# Patient Record
Sex: FEMALE | Race: BLACK OR AFRICAN AMERICAN | NOT HISPANIC OR LATINO | URBAN - METROPOLITAN AREA
[De-identification: names, ages, dates, MRNs, and addresses within clinical notes are randomized per-mention and may not be internally consistent; named-entity substitution may affect disease eponyms.]

---

## 2018-04-13 ENCOUNTER — APPOINTMENT (OUTPATIENT)
Dept: LAB | Facility: HOSPITAL | Age: 73
End: 2018-04-13

## 2018-04-13 ENCOUNTER — TELEPHONE (OUTPATIENT)
Dept: NEUROLOGY | Facility: CLINIC | Age: 73
End: 2018-04-13

## 2018-04-13 ENCOUNTER — OFFICE VISIT (OUTPATIENT)
Dept: NEUROLOGY | Facility: CLINIC | Age: 73
End: 2018-04-13

## 2018-04-13 VITALS
DIASTOLIC BLOOD PRESSURE: 75 MMHG | SYSTOLIC BLOOD PRESSURE: 154 MMHG | WEIGHT: 191 LBS | OXYGEN SATURATION: 97 % | HEART RATE: 92 BPM

## 2018-04-13 DIAGNOSIS — G56.03 BILATERAL CARPAL TUNNEL SYNDROME: Primary | ICD-10-CM

## 2018-04-13 DIAGNOSIS — G62.9 NEUROPATHY: ICD-10-CM

## 2018-04-13 DIAGNOSIS — R79.89 OTHER SPECIFIED ABNORMAL FINDINGS OF BLOOD CHEMISTRY: ICD-10-CM

## 2018-04-13 LAB — HBA1C MFR BLD: 6.2 % (ref 4.8–5.6)

## 2018-04-13 PROCEDURE — 83921 ORGANIC ACID SINGLE QUANT: CPT | Performed by: NURSE PRACTITIONER

## 2018-04-13 PROCEDURE — 83036 HEMOGLOBIN GLYCOSYLATED A1C: CPT | Performed by: NURSE PRACTITIONER

## 2018-04-13 PROCEDURE — 99204 OFFICE O/P NEW MOD 45 MIN: CPT | Performed by: NURSE PRACTITIONER

## 2018-04-13 PROCEDURE — 36415 COLL VENOUS BLD VENIPUNCTURE: CPT | Performed by: NURSE PRACTITIONER

## 2018-04-13 RX ORDER — HYDROCHLOROTHIAZIDE 25 MG/1
25 TABLET ORAL DAILY
COMMUNITY

## 2018-04-13 RX ORDER — NAPROXEN 500 MG/1
500 TABLET ORAL 2 TIMES DAILY WITH MEALS
COMMUNITY

## 2018-04-13 RX ORDER — ATORVASTATIN CALCIUM 20 MG/1
20 TABLET, FILM COATED ORAL DAILY
COMMUNITY

## 2018-04-13 RX ORDER — AMLODIPINE BESYLATE AND ATORVASTATIN CALCIUM 10; 10 MG/1; MG/1
1 TABLET, FILM COATED ORAL DAILY
COMMUNITY

## 2018-04-13 RX ORDER — PREGABALIN 50 MG/1
50 CAPSULE ORAL 2 TIMES DAILY
Qty: 42 CAPSULE | Refills: 0 | Status: SHIPPED | OUTPATIENT
Start: 2018-04-13 | End: 2018-08-28

## 2018-04-13 RX ORDER — GABAPENTIN 300 MG/1
300 CAPSULE ORAL 3 TIMES DAILY
COMMUNITY
End: 2018-04-13

## 2018-04-13 RX ORDER — ASPIRIN 81 MG/1
81 TABLET, CHEWABLE ORAL DAILY
COMMUNITY

## 2018-04-13 RX ORDER — PREGABALIN 50 MG/1
50 CAPSULE ORAL 2 TIMES DAILY
Qty: 60 CAPSULE | Refills: 5 | Status: SHIPPED | OUTPATIENT
Start: 2018-04-13 | End: 2018-08-28

## 2018-04-13 RX ORDER — LEVOTHYROXINE SODIUM 112 UG/1
112 TABLET ORAL DAILY
COMMUNITY

## 2018-04-13 NOTE — PROGRESS NOTES
Subjective:     Patient ID: Wendy Griffiths is a 72 y.o. female.    CC:   Chief Complaint   Patient presents with   • Numbness   • Pain   • Gait Problem       HPI:   History of Present Illness   This is a very pleasant 72-year-old female who presents for initial neurological evaluation of numbness, burning and tingling sensations in bilateral hands and bilateral knees extending into bilateral feet.  She tells me that her symptoms have been present over the past 4 years after she had a total knee replacement.  She tells me that the numbness, tingling and burning sensation in her bilateral lower extremities they told her was secondary to some nerve damage following her surgery.  She tells me that this seems to have worsened over time.  She tells me that her symptoms are worse in the evening.  She's been taking gabapentin 300 mg a maximum of 2 pills at night.  She tells me that she is not able to tolerate 3 pills at night as this causes dizziness and lethargy.  She tells me she did try to take the gabapentin throughout the day but also felt too drowsy and falls asleep.  She tells me that the gabapentin has not really changed her symptoms significantly.  She tells me that she also gets numbness and tingling in her bilateral hands/wrists and has been a  for several years.  She tells me previously she was told she did not have carpal tunnel syndrome but she tells me that her symptoms are worse when she wakes up in the morning as well as when she does use her hands with repetitive movements.  She also tells me that in the remote past she wore some wrist splints but these have not been used in several years.  She tells me that she also has a history of lower back surgery ×2 and from that surgery her great toe on her left side had numbness and tingling.  She tells me she has never had a nerve or muscle study for further evaluation.  She tells me she's never used any creams for the neuropathic type pains.  She tells  me she has significant decrease in mobility in bilateral knees.  She tells me that she also had a stroke in 2008 and she developed numbness in the left side of her face and lips as well as left thumb numbness and she tells me the left face and lip numbness has resolved but the left thumb numbness continues-she is on aspirin and statin therapy for prevention with no recurrent symptoms.  She did recently have labs with her primary care provider 3/9/18 with results reviewed in office including a CBC with differential which showed hemoglobin 11.5, hematocrit 35.8 and otherwise within normal limits.  BMP was within normal limits except glucose was 102.  TSH was 8.22 and her thyroid medication was adjusted by PCP.  Vitamin B12 was 647.  Vitamin D was 34.2.  Her total cholesterol is 188, triglycerides 137, HDL 79.4 and LDL 81.She is not aware of being testing for DM2. She previously lived in [place], [place] with her  of 50 years who unfortunately passed away very unexpectedly on 11/15/2017 and she has moved back to [place], [place] to be close to her family.  She does use a cane for ambulation and denies any recent falls.    The following portions of the patient's history were reviewed and updated as appropriate: allergies, current medications, past family history, past medical history, past social history, past surgical history and problem list.    Past Medical History:   Diagnosis Date   • Arthritis    • Hyperlipidemia    • Hypertension    • Stroke        History reviewed. No pertinent surgical history.    Social History     Social History   • Marital status:      Spouse name: N/A   • Number of children: N/A   • Years of education: N/A     Occupational History   • Not on file.     Social History Main Topics   • Smoking status: Never Smoker   • Smokeless tobacco: Not on file   • Alcohol use No   • Drug use: Unknown   • Sexual activity: Defer     Other Topics Concern   • Not on file     Social History  Narrative   • No narrative on file       Family History   Problem Relation Age of Onset   • Hypertension Maternal Grandmother         Review of Systems   Constitutional: Negative.  Negative for chills, fatigue, fever and unexpected weight change.   HENT: Negative.  Negative for ear pain, hearing loss, nosebleeds, rhinorrhea and sore throat.    Eyes: Negative.  Negative for photophobia, pain, discharge, itching and visual disturbance.   Respiratory: Negative.  Negative for cough, chest tightness, shortness of breath and wheezing.    Cardiovascular: Negative.  Negative for chest pain, palpitations and leg swelling.   Gastrointestinal: Negative.  Negative for abdominal pain, blood in stool, constipation, diarrhea, nausea and vomiting.   Endocrine: Positive for heat intolerance.   Genitourinary: Negative.  Negative for dysuria, frequency, hematuria and urgency.   Musculoskeletal: Positive for arthralgias, back pain, gait problem, joint swelling, myalgias, neck pain and neck stiffness.   Skin: Negative.  Negative for rash and wound.   Allergic/Immunologic: Negative.  Negative for environmental allergies and food allergies.   Neurological: Positive for numbness. Negative for dizziness, tremors, seizures, syncope, speech difficulty, weakness, light-headedness and headaches.   Hematological: Negative for adenopathy. Does not bruise/bleed easily.   Psychiatric/Behavioral: Negative.  Negative for agitation, confusion, decreased concentration, hallucinations, sleep disturbance and suicidal ideas. The patient is not nervous/anxious.         Objective:    Neurologic Exam     Mental Status   Oriented to person, place, and time.   Registration: recalls 3 of 3 objects. Recall at 5 minutes: recalls 3 of 3 objects. Follows 3 step commands.   Attention: normal. Concentration: normal.   Speech: speech is normal   Level of consciousness: alert  Knowledge: good and consistent with education. Able to perform simple calculations.   Able to  "name object. Able to read. Able to repeat. Able to write. Normal comprehension.     Cranial Nerves   Cranial nerves II through XII intact.     Motor Exam   Muscle bulk: normal  Overall muscle tone: normal    Strength   Strength 5/5 except as noted. Bilateral Knees with Limited ROM     Sensory Exam   Right arm light touch: normal  Left arm light touch: normal  Right leg light touch: decreased from knee  Left leg light touch: decreased from knee  Right arm vibration: normal  Left arm vibration: normal  Right leg vibration: decreased from knee  Left leg vibration: decreased from knee (hyperasthesias and \"shock like\" sensations radiating to left knee)  Proprioception normal.   Right leg pinprick: decreased from toes  Left leg pinprick: decreased from toes (with hyperasthesias on Left distal 1/3 plantar surface)  + Tinel's Bilateral Wrists Mild, Negative Phalen's Bilateral Wrists     Gait, Coordination, and Reflexes     Gait  Gait: wide-based (antalgic with chronic knee pain and using cane)    Coordination   Finger to nose coordination: normal  Heel to shin coordination: normal    Tremor   Resting tremor: absent  Intention tremor: absent  Action tremor: absent    Reflexes   Right brachioradialis: 2+  Left brachioradialis: 2+  Right biceps: 2+  Left biceps: 2+  Right triceps: 2+  Left triceps: 2+  Right patellar: 1+  Left patellar: 1+  Right achilles: 1+  Left achilles: 1+  Right : 2+  Left : 2+  Right plantar: normal  Left plantar: normal  Right Michelle: absent  Left Michelle: absent  Right ankle clonus: absent  Left ankle clonus: absent      Physical Exam   Constitutional: She is oriented to person, place, and time. She appears well-developed and well-nourished.   Eyes:   Fundoscopic exam:       The right eye shows red reflex.        The left eye shows red reflex.   Cardiovascular: Normal rate, regular rhythm, S1 normal, S2 normal and normal heart sounds.    Pulmonary/Chest: Effort normal and breath sounds " normal.   Neurological: She is oriented to person, place, and time. She has a normal Finger-Nose-Finger Test and a normal Heel to Shin Test.   Reflex Scores:       Tricep reflexes are 2+ on the right side and 2+ on the left side.       Bicep reflexes are 2+ on the right side and 2+ on the left side.       Brachioradialis reflexes are 2+ on the right side and 2+ on the left side.       Patellar reflexes are 1+ on the right side and 1+ on the left side.       Achilles reflexes are 1+ on the right side and 1+ on the left side.  Skin:   Vitiligo Generalized   Psychiatric: She has a normal mood and affect. Her speech is normal and behavior is normal. Judgment and thought content normal. Cognition and memory are normal.       Assessment/Plan:       Wendy was seen today for numbness, pain and gait problem.    Diagnoses and all orders for this visit:    Bilateral carpal tunnel syndrome  -     EMG & Nerve Conduction Test  -     Elastic Bandages & Supports (WRIST SPLINT/COCK-UP/LEFT SM) misc; WEAR QHS PRN  -     Elastic Bandages & Supports (WRIST SPLINT/COCK-UP/RIGHT SM) misc; WEAR QHS PRN    Neuropathy  -     Methylmalonic Acid, Serum  -     Hemoglobin A1c  -     EMG & Nerve Conduction Test  -     pregabalin (LYRICA) 50 MG capsule; Take 1 capsule by mouth 2 (Two) Times a Day.  -     pregabalin (LYRICA) 50 MG capsule; Take 1 capsule by mouth 2 (Two) Times a Day.    Other specified abnormal findings of blood chemistry   -     Hemoglobin A1c         We will get a hemoglobin A1c as well as methylmalonic acid.  We will discontinue her gabapentin and switch her to low dose Lyrica.  I've asked her to start taking this at night for a few days and then if it is tolerated she can switch to twice a day.  If it does cause dizziness or drowsiness that is significant she may take 2 in the evening.  I did give her a free voucher to start and see how she does tolerating this.  She will call us with any issues with affording this or side  effects.  We'll also get an EMG and NCV S to evaluate for the neuropathy.  She does have some symptoms consistent with a small fiber neuropathy, but we will get the EMG first.  May consider using neuro cream, but she prefers to wait on this for now.  We will also order wrist splints for carpal tunnel symptoms.  We will follow-up in about 10-12 weeks for reevaluation of symptoms.  Again I have asked her to please call me with any questions, concerns or issues with any medication.  She is concerned about cost and I explained that they do have a patient assistance program if needed and we are happy to help her get this approved. Reviewed medications, potential side effects and signs and symptoms to report. Discussed risk versus benefits of treatment plan with patient and/or family-including medications, labs and radiology that may be ordered. Addressed questions and concerns during visit. Patient and/or family verbalized understanding and agree with plan.  Since she does have a history of the lower back surgery as well as any surgeries I do not feel any additional lab work besides what has been ordered today and from her PCP should be completed.    During this visit the following were done:  Labs Reviewed [x]    Labs Ordered [x]    Radiology Reports Reviewed []    Radiology Ordered [x]  Other EMG/ncvs  PCP Records Reviewed []    Referring Provider Records Reviewed []    ER Records Reviewed []    Hospital Records Reviewed []    History Obtained From Family []    Radiology Images Reviewed []    Other Reviewed []    Records Requested []      As part of this patient's treatment plan I am prescribing controlled substances. The patient has been made aware of appropriate use of such medications, including potential risk of somnolence, limited ability to drive and/or work safely, and potential for dependence or overdose. It has also been made clear that these medications are for use by the patient only, without concomitant use  of alcohol or other substances unless prescribed. Keep out of reach of children.  Adán report has been reviewed. If this is going to be prescribed long term, [facility] Controlled Substance Agreement Contract has also been read and signed by patient and myself.    EMR Dragon/Transcription Disclaimer:  Much of this encounter note is an electronic transcription of spoken language to printed text. Electronic transcription of spoken language may permit erroneous words or phrases to be inadvertently transcribed. Although I have reviewed the note for such errors, some may still exist in this documentation.      Leila Reyes, APRN  4/13/2018

## 2018-04-17 ENCOUNTER — TELEPHONE (OUTPATIENT)
Dept: NEUROLOGY | Facility: CLINIC | Age: 73
End: 2018-04-17

## 2018-04-17 NOTE — TELEPHONE ENCOUNTER
Pt called and said she wanted to let you know she has been taking the Lyrica and is still having pain everywhere with no improvement.  I told her it usually takes 4-6 wks for the full affect and she said if you wanted her to continue taking/switch  the medication she would be ok with either.

## 2018-04-17 NOTE — TELEPHONE ENCOUNTER
If no change in the next week I will increase the Lyrica to 100mg twice a day. As long as she isnt having dizziness. Have her let us know next week. It takes longer than 5 days just wrote script on 4/13/18

## 2018-04-18 ENCOUNTER — TELEPHONE (OUTPATIENT)
Dept: NEUROLOGY | Facility: CLINIC | Age: 73
End: 2018-04-18

## 2018-04-18 LAB — METHYLMALONATE SERPL-SCNC: 317 NMOL/L (ref 0–378)

## 2018-04-18 NOTE — TELEPHONE ENCOUNTER
I called pt and let her know that Leila would like her to continue with the lyrica and if no change in the next week she will increase the lyrica to 100 mg twice a day and pt verbalized understanding

## 2018-04-19 NOTE — TELEPHONE ENCOUNTER
Please fax labs to primary care provider. Please notify patient her diabetes testing shows her level slightly elevated at the pre-diabetes level and she can discuss with her PCP about diet adjustments and likely does not require any medications for this at this time. It should be rechecked with PCP in 3-6 months. Thanks!

## 2018-04-19 NOTE — PROGRESS NOTES
Please fax labs to primary care provider. Please notify patient her diabetes testing shows her level slightly elevated at the pre-diabetes level and she can discuss with her PCP about diet adjustments and likely does not require any medications for this at this time. It should be rechecked with PCP in 3-6 months. Thanks, MADELAINE Reyes, APRN

## 2018-05-07 ENCOUNTER — HOSPITAL ENCOUNTER (OUTPATIENT)
Dept: NEUROLOGY | Facility: HOSPITAL | Age: 73
Discharge: HOME | End: 2018-05-07
Admitting: NURSE PRACTITIONER

## 2018-05-07 PROCEDURE — 95913 NRV CNDJ TEST 13/> STUDIES: CPT

## 2018-05-07 PROCEDURE — 95886 MUSC TEST DONE W/N TEST COMP: CPT

## 2018-05-08 ENCOUNTER — TELEPHONE (OUTPATIENT)
Dept: NEUROLOGY | Facility: CLINIC | Age: 73
End: 2018-05-08

## 2018-05-08 NOTE — TELEPHONE ENCOUNTER
I called and relayed the results of the patients nerve and muscle study  And faxed over the results to the PCP

## 2018-05-08 NOTE — TELEPHONE ENCOUNTER
Please notify patient her nerve and muscle study does show mild to moderate generalized neuropathy. No evidence of other abnormalities. We will f/u as scheduled in office to discuss in more detail. Thanks fax results to PCP and hgba1c was 6.2% (not 11.5% as listed on this report)

## 2018-06-01 ENCOUNTER — APPOINTMENT (OUTPATIENT)
Dept: NEUROLOGY | Facility: HOSPITAL | Age: 73
End: 2018-06-01

## 2018-08-28 ENCOUNTER — OFFICE VISIT (OUTPATIENT)
Dept: NEUROLOGY | Facility: CLINIC | Age: 73
End: 2018-08-28

## 2018-08-28 VITALS
SYSTOLIC BLOOD PRESSURE: 132 MMHG | WEIGHT: 186 LBS | DIASTOLIC BLOOD PRESSURE: 64 MMHG | OXYGEN SATURATION: 98 % | HEART RATE: 86 BPM

## 2018-08-28 DIAGNOSIS — G89.29 CHRONIC PAIN OF BOTH KNEES: ICD-10-CM

## 2018-08-28 DIAGNOSIS — G62.9 NEUROPATHY: Primary | ICD-10-CM

## 2018-08-28 DIAGNOSIS — M25.562 CHRONIC PAIN OF BOTH KNEES: ICD-10-CM

## 2018-08-28 DIAGNOSIS — M25.561 CHRONIC PAIN OF BOTH KNEES: ICD-10-CM

## 2018-08-28 DIAGNOSIS — G56.03 BILATERAL CARPAL TUNNEL SYNDROME: ICD-10-CM

## 2018-08-28 DIAGNOSIS — M79.10 MYALGIA: ICD-10-CM

## 2018-08-28 PROCEDURE — 99214 OFFICE O/P EST MOD 30 MIN: CPT | Performed by: NURSE PRACTITIONER

## 2018-08-28 RX ORDER — GABAPENTIN 300 MG/1
300-600 CAPSULE ORAL NIGHTLY
COMMUNITY

## 2018-08-28 RX ORDER — DULOXETIN HYDROCHLORIDE 60 MG/1
60 CAPSULE, DELAYED RELEASE ORAL DAILY
Qty: 30 CAPSULE | Refills: 2 | Status: SHIPPED | OUTPATIENT
Start: 2018-08-28

## 2018-08-28 NOTE — PROGRESS NOTES
Subjective:     Patient ID: Wendy Griffiths is a 73 y.o. female.    CC:   Chief Complaint   Patient presents with   • Peripheral Neuropathy       HPI:   History of Present Illness     This is a very pleasant 73-year-old female who presents for 3 month follow-up on neuropathy symptoms present over the past 4 years.  Since last visit she had an EMG and NCV S of bilateral upper and lower extremities on 5/7/18 showing generalized mild to moderate sensorimotor neuropathy and no clear nerve entrapments or radiculopathy seen.  She has been taking gabapentin 300 mg 1 pill at night.  She did try taking the Lyrica 50 mg twice a day for 2 weeks and tells me that this did not help and when she tried to get a refill she said it was too expensive for her to afford.  She did not see a significant difference with the Lyrica.  She did have a hemoglobin A1c of 6.2% last visit and she was started on metformin by her primary care provider.  Her B12 was previously low normal and methylmalonic acid is 317 normal.  She tells me that she hurts, aches and feels pain all over.  She tells me that she has numbness, tingling and burning sensation in all 4 extremities with moderate discomfort.  She does tell me that she has a history of lower back surgery as well as her right total knee replacement x2 and feels like she had gotten no benefit from the knee replacement.  She has severe pain in both knees and has not had any injections for the left knee.  She would like to be referred to pain management for other treatment options.  She is wondering if there is anything she can take for her neuropathy symptoms that would not cause her to be so drowsy.  She tells me she cannot live without the gabapentin but cannot take it during the day.    She also mentions to me today that she feels like her memory is not great.  She previously forgot her follow-up in our office.  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  She works as a  flortylist.  She forgot to get her prescriptions for her wrist splints still last visit needs new orders for this.  She does continue to have some carpal tunnel symptoms although her EMG did not show abnormalities at this time.  She feels like her short-term memory has been somewhat poor.  She is alone during today's visit.  Her  of 50 yrs did pass away in November of last year. She is independent in ADLs and driving.    The following portions of the patient's history were reviewed and updated as appropriate: allergies, current medications, past family history, past medical history, past social history, past surgical history and problem list.    Past Medical History:   Diagnosis Date   • Arthritis    • Hyperlipidemia    • Hypertension    • Stroke (CMS/HCC)        No past surgical history on file.    Social History     Social History   • Marital status:      Spouse name: N/A   • Number of children: N/A   • Years of education: N/A     Occupational History   • Not on file.     Social History Main Topics   • Smoking status: Never Smoker   • Smokeless tobacco: Not on file   • Alcohol use No   • Drug use: Unknown   • Sexual activity: Defer     Other Topics Concern   • Not on file     Social History Narrative   • No narrative on file       Family History   Problem Relation Age of Onset   • Hypertension Maternal Grandmother         Review of Systems   Constitutional: Positive for fatigue. Negative for chills, fever and unexpected weight change.   HENT: Negative for ear pain, hearing loss, nosebleeds, rhinorrhea and sore throat.    Eyes: Negative for photophobia, pain, discharge, itching and visual disturbance.   Respiratory: Negative for cough, chest tightness, shortness of breath and wheezing.    Cardiovascular: Positive for leg swelling. Negative for chest pain and palpitations.   Gastrointestinal: Positive for constipation. Negative for abdominal pain, blood in stool, diarrhea, nausea and vomiting.    Genitourinary: Negative for dysuria, frequency, hematuria and urgency.   Musculoskeletal: Positive for arthralgias, joint swelling, neck pain and neck stiffness. Negative for back pain, gait problem and myalgias.   Skin: Negative for rash and wound.   Allergic/Immunologic: Negative for environmental allergies and food allergies.   Neurological: Positive for numbness. Negative for dizziness, tremors, seizures, syncope, speech difficulty, weakness, light-headedness and headaches.   Hematological: Negative for adenopathy. Does not bruise/bleed easily.   Psychiatric/Behavioral: Negative for agitation, confusion, decreased concentration, hallucinations, sleep disturbance and suicidal ideas. The patient is not nervous/anxious.    All other systems reviewed and are negative.       Objective:    Neurologic Exam     Mental Status   Oriented to person, place, and time.   Level of consciousness: alert    Cranial Nerves   Cranial nerves II through XII intact.     Motor Exam   Muscle bulk: normal  Overall muscle tone: normal    Strength   Strength 5/5 throughout.     Sensory Exam   Right leg light touch: decreased from knee  Left leg light touch: decreased from knee  Right leg vibration: decreased from knee  Left leg vibration: decreased from knee  Right leg pinprick: decreased from toes  Left leg pinprick: decreased from toes    Gait, Coordination, and Reflexes     Gait  Gait: (antalgic, limping severe from left knee pain)    Coordination   Finger to nose coordination: normal    Tremor   Resting tremor: absent  Intention tremor: absent  Action tremor: absent    Reflexes   Right brachioradialis: 2+  Left brachioradialis: 2+  Right biceps: 2+  Left biceps: 2+  Right triceps: 2+  Left triceps: 2+  Right patellar: 1+  Left patellar: 1+  Right achilles: 1+  Left achilles: 1+  Right : 2+  Left : 2+      Physical Exam   Constitutional: She is oriented to person, place, and time.   Musculoskeletal:   Tenderness over bilateral  "shoulders, forearms, knees, ankles   Neurological: She is oriented to person, place, and time. She has normal strength. She has a normal Finger-Nose-Finger Test.   Reflex Scores:       Tricep reflexes are 2+ on the right side and 2+ on the left side.       Bicep reflexes are 2+ on the right side and 2+ on the left side.       Brachioradialis reflexes are 2+ on the right side and 2+ on the left side.       Patellar reflexes are 1+ on the right side and 1+ on the left side.       Achilles reflexes are 1+ on the right side and 1+ on the left side.  Skin:   Vitiligo       Assessment/Plan:       Wendy was seen today for peripheral neuropathy.    Diagnoses and all orders for this visit:    Neuropathy  -     DULoxetine (CYMBALTA) 60 MG capsule; Take 1 capsule by mouth Daily.  -     Ambulatory Referral to Pain Management    Myalgia  -     CK Total & CKMB  -     Ambulatory Referral to Pain Management    Chronic pain of both knees  Comments:  Right TKR x 2  Orders:  -     Ambulatory Referral to Pain Management    Bilateral carpal tunnel syndrome  Comments:  wrist splints-encouraged her to obtain them  Orders:  -     Elastic Bandages & Supports (WRIST SPLINT/COCK-UP/LEFT SM) misc; WEAR QHS PRN  -     Elastic Bandages & Supports (WRIST SPLINT/COCK-UP/RIGHT SM) misc; WEAR QHS PRN           Will add Cymbalta for neuropathy symptoms. Pain management referral. Continue gabapentin from PCP. Will check CK with myalgias. Scheduled to see PCP tomorrow for \"hot flashes\". Will f/u in 6-8 weeks and will eval memory with MOCA and consider MRI Brain. Will also screen for depression. Reviewed medications, potential side effects and signs and symptoms to report. Discussed risk versus benefits of treatment plan with patient and/or family-including medications, labs and radiology that may be ordered. Addressed questions and concerns during visit. Patient and/or family verbalized understanding and agree with plan.    During this visit the " following were done:  Labs Reviewed [x]    Labs Ordered []    Radiology Reports Reviewed []    Radiology Ordered []    PCP Records Reviewed []    Referring Provider Records Reviewed []    ER Records Reviewed []    Hospital Records Reviewed []    History Obtained From Family []    Radiology Images Reviewed []    Other Reviewed [x]    Records Requested []      EMR Dragon/Transcription Disclaimer:  Much of this encounter note is an electronic transcription of spoken language to printed text. Electronic transcription of spoken language may permit erroneous words or phrases to be inadvertently transcribed. Although I have reviewed the note for such errors, some may still exist in this documentation.    Leila Reyes, APRN  8/28/2018

## 2018-08-29 ENCOUNTER — APPOINTMENT (OUTPATIENT)
Dept: LAB | Facility: HOSPITAL | Age: 73
End: 2018-08-29

## 2018-08-29 LAB
CK MB SERPL-CCNC: 1.41 NG/ML (ref 0–5)
CK SERPL-CCNC: 156 U/L (ref 26–174)

## 2018-08-29 PROCEDURE — 82550 ASSAY OF CK (CPK): CPT | Performed by: NURSE PRACTITIONER

## 2018-08-29 PROCEDURE — 36415 COLL VENOUS BLD VENIPUNCTURE: CPT | Performed by: NURSE PRACTITIONER

## 2018-08-29 PROCEDURE — 82553 CREATINE MB FRACTION: CPT | Performed by: NURSE PRACTITIONER

## 2018-08-30 ENCOUNTER — TELEPHONE (OUTPATIENT)
Dept: NEUROLOGY | Facility: CLINIC | Age: 73
End: 2018-08-30

## 2018-08-30 NOTE — PROGRESS NOTES
Please notify patient her muscle enzyme labs were normal. Please fax labs to PCP. Recommend keeping appointment with pain management. Thanks, MELISSA Rivas

## 2018-08-30 NOTE — TELEPHONE ENCOUNTER
----- Message from MELISSA Givens sent at 8/30/2018  9:06 AM EDT -----  Please notify patient her muscle enzyme labs were normal. Please fax labs to PCP. Recommend keeping appointment with pain management. Thanks, MELISSA Rivas

## 2020-06-02 ENCOUNTER — TELEPHONE (OUTPATIENT)
Dept: NEUROLOGY | Facility: CLINIC | Age: 75
End: 2020-06-02

## 2020-06-02 NOTE — TELEPHONE ENCOUNTER
GUILLERMO FROM [employer] CALLED REGARDING IF THE MED RECORDS REQUEST HAD BEEN RECEIVED FOR PT'S MED RECORDS FROM 2013 THROUGH 2018. WARM TRANSFERRED TO [facility] SINCE PT'S RECORD IS MARKED AS  AND SHE SAID TO TELL GUILLERMO THAT IT HAD BEEN RECEIVED BUT TO TELL GUILLERMO WE ARE UNABLE TO GIVE ANY ADDITIONAL INFO AT THIS TIME. GUILLERMO CONTINUED TO ASK QUESTIONS IN WHICH I TOLD HIM I COULD NOT GIVE HIM ANY ADDITIONAL INFO. HE THEN WANTED TO KNOW HOW LONG IT WOULD TAKE BECAUSE IT WAS URGENT AND I REPEATED THAT I WAS UNABLE TO GIVE HIM A TIMEFRAME. HE THEN TOOK MY NAME AND ENDED THE CALL.